# Patient Record
Sex: MALE | Race: WHITE
[De-identification: names, ages, dates, MRNs, and addresses within clinical notes are randomized per-mention and may not be internally consistent; named-entity substitution may affect disease eponyms.]

---

## 2019-09-06 NOTE — NUR
09/06/19 1238 Stacia Houston
1234 PATIENT ARRIVES TO PACU AWAKE, BUT DROWSY. RESP EVEN AND
UNLABORED, ROOM AIR SATS >95%.

## 2019-09-09 NOTE — OR
[facility] [place] [facility]
                                    [address], [place]  [address]
_________________________________________________________________________________________
                                                                 Signed   
 
 
DATE OF OPERATION:
09/06/2019
 
SURGEON:
Keith Butler MD
 
PREOPERATIVE DIAGNOSIS:
Trigger finger, middle, left.
 
POSTOPERATIVE DIAGNOSIS:
Trigger finger, middle, left.
 
PROCEDURE PERFORMED:
Left middle trigger finger release.
 
ANESTHESIA:
[place].
 
ASSISTANT:
None.
 
TOURNIQUET TIME:
20 minutes.
 
BRIEF HISTORY:
Stephane is a 63-year-old gentleman with painful locking his hand.  He also has
underlying mild Dupuytren's disease.  Risks and benefits of operative treatment
discussed with him.  He elected to proceed. 
 
DESCRIPTION OF PROCEDURE:
Once consent was obtained, he was taken to the operating room.  After adequate
anesthesia, he was placed on operating table.  All downside pressure points were well
padded.  The arm was prepped and draped in a standard sterile fashion.  The [place] block
was allowed to established and then 1.5 cm incision was made overlying the middle ray at
the A1 pulley.  This was taken through skin and subcutaneous tissue.  Numerous
Dupuytren's fascial bands were encountered and released in order to visualize the
underlying tendon sheath.  The neurovascular bundles were not encountered.  The A1
pulley was then dissected free of overlying soft tissue under loupe magnification.  It
was then released.  The patient was asked to move his hand.  He was able to fully flex
and fully extend with no locking.  The wound was copiously irrigated with antibiotic
solution, 
closed with 3-0 nylon, and dressed with bacitracin, Adaptic, 4 x 8, and gauze.  He
 
    Electronically Signed By: KEITH BUTLER MD  09/09/19 0659
_________________________________________________________________________________________
PATIENT NAME:     STEPHANE JASMINE                       
MEDICAL RECORD #: [id number]            OPERATIVE REPORT              
          ACCT #: [id number]  
DATE OF BIRTH:   [birth year]            REPORT #: [id number]      
PHYSICIAN:        KEITH BUTLER MD              
PCP:              JEANMARIE CASAS MD      
REPORT IS CONFIDENTIAL AND NOT TO BE RELEASED WITHOUT AUTHORIZATION
 
 
                                  [facility] [place] [facility]
                                    [address]
                                  [place]  [address]
_________________________________________________________________________________________
                                                                 Signed   
 
 
tolerated the procedure well.  All sponge, needle, and instrument counts were correct. 
 
 
 
            ________________________________________
            MD VANESSA Ty/MODL
Job #:  [id number]
DD:  09/06/2019 12:32:25
DT:  09/06/2019 18:22:05
 
 
Copies:                                
~
 
 
 
 
 
 
 
 
 
 
 
 
 
 
 
 
 
 
 
 
 
 
 
 
 
 
 
    Electronically Signed By: KEITH BUTLER MD  09/09/19 0659
_________________________________________________________________________________________
PATIENT NAME:     STEPHANE JASMINE                       
MEDICAL RECORD #: [id number]            OPERATIVE REPORT              
          ACCT #: [id number]  
DATE OF BIRTH:   [birth year]            REPORT #: [id number]      
PHYSICIAN:        KEITH BUTELR MD              
PCP:              JEANMARIE CASAS MD      
REPORT IS CONFIDENTIAL AND NOT TO BE RELEASED WITHOUT AUTHORIZATION

## 2021-02-16 ENCOUNTER — HOSPITAL ENCOUNTER (OUTPATIENT)
Dept: HOSPITAL 46 - DS | Age: 66
Discharge: HOME | End: 2021-02-16
Attending: SURGERY
Payer: OTHER GOVERNMENT

## 2021-02-16 VITALS — HEIGHT: 70 IN | BODY MASS INDEX: 23.96 KG/M2 | WEIGHT: 167.33 LBS

## 2021-02-16 DIAGNOSIS — J44.9: ICD-10-CM

## 2021-02-16 DIAGNOSIS — K21.9: ICD-10-CM

## 2021-02-16 DIAGNOSIS — Z86.010: ICD-10-CM

## 2021-02-16 DIAGNOSIS — M19.90: ICD-10-CM

## 2021-02-16 DIAGNOSIS — E78.5: ICD-10-CM

## 2021-02-16 DIAGNOSIS — F17.210: ICD-10-CM

## 2021-02-16 DIAGNOSIS — K63.5: Primary | ICD-10-CM

## 2021-02-16 PROCEDURE — 0DBP8ZX EXCISION OF RECTUM, VIA NATURAL OR ARTIFICIAL OPENING ENDOSCOPIC, DIAGNOSTIC: ICD-10-PCS | Performed by: SURGERY

## 2021-02-16 PROCEDURE — G0500 MOD SEDAT ENDO SERVICE >5YRS: HCPCS

## 2021-02-16 NOTE — NUR
02/16/21 1016 Penny Foley 0950 PT ARRIVED IN PACU SLEEPY WITH NO C/O'S. ABD SOFT. 1000
RESTING. REU. 1015 SITTING UP IN BED SIPPING ON WATER.

## 2021-02-17 NOTE — OR
[facility]
                                    [address], [place]  [address]
_________________________________________________________________________________________
                                                                 Signed   
 
 
DATE OF OPERATION:
02/16/2021
 
SURGEON:
Peter Camacho MD
 
PREOPERATIVE DIAGNOSIS:
Personal history of colonic polyps at age 65 in 2015 with adenomatous polyp in
transverse colon. 
 
POSTOPERATIVE DIAGNOSES:
1. 3-4 mm rectal polyps at 8 cm.
2. 5 mm rectal polyp at 15 cm.
3. Minimal internal hemorrhoids.
 
PROCEDURE:
Colonoscopy with hot biopsy.
 
ESTIMATED BLOOD LOSS:
None.
 
INDICATIONS:
Stephane is a 65-year-old gentleman asked to see me for a followup colonoscopy.  In 2015
at the age of 65, he underwent a colonoscopy at the [facility] in [place], [place].  He had a single adenomatous polyp removed from his transverse colon.  He
has no family history of colon cancer or polyps.  He has no lower GI complaints.  In the
office, I gave Stephane a pamphlet on colonoscopy.  He understands the nature of the
test along with its risks including, but not limited to gas bloating, crampy abdominal
pain, bleeding, perforation requiring surgery, and missed diagnosis.  We also reviewed
the idea that polyps grow and become cancers.  Consequently, he will stay on the 5-year
rotation.  He also understands the need for IV conscious sedation.  He had expressed
understanding and wished to proceed. 
 
PROCEDURE NOTE:
Stephane was taken into our endoscopy suite and placed in the left lateral decubitus
position.  A digital rectal exam was performed and he does have some induration to his
prostate gland, left a little more prominent than the right.  The scope had been
introduced and advanced under direct visualization of camera.  It took a minute to get
around the splenic flexure and he has a long redundant transverse colon.  It took extra
sedation abdominal compression in order to advance the scope.  Eventually made it around
his hepatic flexure after several tries.  We then traveled into the cecum itself.  The
right colon __________ in length.  His prep was quite good.  We could easily see the
 
    Electronically Signed By: PETER CAMACHO MD  02/17/21 0632
_________________________________________________________________________________________
PATIENT NAME:     STEPHANE JASMINE                    
MEDICAL RECORD #: [id number]            OPERATIVE REPORT              
          ACCT #: [id number]  
DATE OF BIRTH:   [birth year]            REPORT #: [id number]      
PHYSICIAN:        PETER CAMACHO MD             
PCP:              JEANMARIE CASAS MD      
REPORT IS CONFIDENTIAL AND NOT TO BE RELEASED WITHOUT AUTHORIZATION
 
 
                                  [facility]
                                    [address], [place]  [address]
_________________________________________________________________________________________
                                                                 Signed   
 
 
appendiceal orifice and the ileocecal valve.  The scope was then slowly withdrawn.  We
took pictures throughout for photodocumentation.  The above-mentioned polyps had been
easily removed with the help of hot biopsy forceps.  He had several tiny hyperplastic
appearing polyps in the mid rectum and several were biopsied and several were
cauterized.  Once in the rectum, the scope had been retroflexed and we can see there is
just very minimal internal hemorrhoid tissue.  The gas was then suctioned out and the
colonoscope removed.  Stephane tolerated the procedure quite well. 
 
RECOMMENDATIONS:
I will see Stephane back in my office in 7 to 14 days to review his results.  It looks
like he will stay on the 5-year rotation. 
 
 
 
            ________________________________________
            Peter Camacho MD 
 
 
ALB/MODL
Job #:  [id number]
DD:  02/16/2021 09:58:27
DT:  02/16/2021 12:56:25
 
cc:            MD Peter Travis MD
 
 
Copies:  JEANMARIE CASAS MD, ANDREW L MD
~
 
 
 
 
 
 
 
 
 
 
 
 
    Electronically Signed By: PETER CAMACHO MD  02/17/21 0632
_________________________________________________________________________________________
PATIENT NAME:     STEPHANE JASMINE                    
MEDICAL RECORD #: [id number]            OPERATIVE REPORT              
          ACCT #: [id number]  
DATE OF BIRTH:   [birth year]            REPORT #: [id number]      
PHYSICIAN:        PETER CAMACHO MD             
PCP:              JEANMARIE CASAS MD      
REPORT IS CONFIDENTIAL AND NOT TO BE RELEASED WITHOUT AUTHORIZATION
